# Patient Record
Sex: FEMALE | Race: WHITE | NOT HISPANIC OR LATINO | URBAN - NONMETROPOLITAN AREA
[De-identification: names, ages, dates, MRNs, and addresses within clinical notes are randomized per-mention and may not be internally consistent; named-entity substitution may affect disease eponyms.]

---

## 2018-10-15 PROBLEM — H52.4: Noted: 2018-10-15

## 2018-10-15 PROBLEM — H43.813: Noted: 2018-10-15

## 2018-10-15 PROBLEM — H25.13: Noted: 2018-10-15

## 2021-06-25 ENCOUNTER — IMPORTED ENCOUNTER (OUTPATIENT)
Dept: URBAN - NONMETROPOLITAN AREA CLINIC 1 | Facility: CLINIC | Age: 76
End: 2021-06-25

## 2021-06-25 PROCEDURE — 92014 COMPRE OPH EXAM EST PT 1/>: CPT

## 2021-06-25 PROCEDURE — 92015 DETERMINE REFRACTIVE STATE: CPT

## 2021-06-25 NOTE — PATIENT DISCUSSION
Mansi OUDiscussed diagnosis with patient. Reviewed symptoms related to cataract progression. Discussed various treatment options with patient. Recommend cataract evaluation by Dr. Chiquis Abdalla. Patient elects to schedule evaluation. PVD OUDiscussed findings of exam in detail with the patient. The risk of retinal detachment in patients with PVDs was discussed with the patient and the warning signs of retinal detachment were carefully reviewed with the patient. The patient was warned to return to the office or contact the ophthalmologist on call immediately if they experience signs of retinal detachment. Continue to monitor. Presbyopia OUDiscussed refractive status in detail with patient. MR done today but do not recommend updating due to cataract progression. Continue to monitor.  ****ONLY USE TROPICAMIDE TO DILATE*****

## 2021-08-18 ENCOUNTER — IMPORTED ENCOUNTER (OUTPATIENT)
Dept: URBAN - NONMETROPOLITAN AREA CLINIC 1 | Facility: CLINIC | Age: 76
End: 2021-08-18

## 2021-08-18 PROCEDURE — 92014 COMPRE OPH EXAM EST PT 1/>: CPT

## 2021-08-18 NOTE — PATIENT DISCUSSION
Cataract(s)-Visually significant cataract OU .-Cataract(s) causing symptomatic impairment of visual function not correctable with a tolerable change in glasses or contact lenses lighting or non-operative means resulting in specific activity limitations and/or participation restrictions including but not limited to reading viewing television driving or meeting vocational or recreational needs. -Expectation is clearer vision and functional improvement in symptoms as well as reduced glare disability after cataract removal.-Order IOLMaster and OPD today. -Recommend Stand/trad  based on today's OPD testing and lifestyle questionnaire.-All questions were answered regarding surgery including pre and post-op medications appointments activity restrictions and anesthetic usage.-The risks benefits and alternatives and special risk factors for the patient were discussed in detail including but not limited to: bleeding infection retinal detachment vitreous loss problems with the implant and possible need for additional surgery.-Although rare the possibility of complete vision loss was discussed.-The possible need for glasses post-operatively was discussed.-Order medical clearance exam based on history of irregular heart beat -Patient elects to proceed with cataract surgery OS. Will schedule at patient's convenience and re-evaluate OD  in the future. Discussed lens options. Pt elects Stand/Trad OU discussed the need for bifocals s/p surgery Post op inflammation anticipated discussed dextenza insertion after surgery.

## 2021-09-14 ENCOUNTER — IMPORTED ENCOUNTER (OUTPATIENT)
Dept: URBAN - NONMETROPOLITAN AREA CLINIC 1 | Facility: CLINIC | Age: 76
End: 2021-09-14

## 2021-09-14 PROBLEM — Z01.818: Noted: 2021-09-14

## 2021-09-14 PROBLEM — F41.1: Noted: 2021-09-14

## 2021-09-14 PROBLEM — Z95.0: Noted: 2021-09-14

## 2021-09-14 PROBLEM — I50.9: Noted: 2021-09-14

## 2021-10-14 ENCOUNTER — IMPORTED ENCOUNTER (OUTPATIENT)
Dept: URBAN - NONMETROPOLITAN AREA CLINIC 1 | Facility: CLINIC | Age: 76
End: 2021-10-14

## 2021-10-14 PROBLEM — H25.11: Noted: 2021-10-14

## 2021-10-14 PROBLEM — Z98.42: Noted: 2021-10-14

## 2021-10-14 PROCEDURE — 99024 POSTOP FOLLOW-UP VISIT: CPT

## 2021-10-14 NOTE — PATIENT DISCUSSION
1 Day POV CE OS 10/13/21 Lensx/LRI+Dextenza- Discussed diagnosis in detail with patient- Patient is stable and doing well- Wound intact- Continue all post op drops as directed- Continue to monitor- RTC 1 week POV Cataract OD-Visually significant.-Cataract causing symptomatic impairment of visual function not correctable with a tolerable change in glasses or contact lenses lighting or non-operative means resulting in specific activity limitations and/or participation restrictions including but not limited to reading viewing television driving or meeting vocational or recreational needs. -Expectation is clearer vision and reduced glare disability after cataract removal.-Refer to Dr Fam Slater for cataract evaluation

## 2021-10-19 ENCOUNTER — IMPORTED ENCOUNTER (OUTPATIENT)
Dept: URBAN - NONMETROPOLITAN AREA CLINIC 1 | Facility: CLINIC | Age: 76
End: 2021-10-19

## 2021-10-19 PROBLEM — Z95.0: Noted: 2021-10-19

## 2021-10-19 PROBLEM — F41.9: Noted: 2021-10-19

## 2021-10-19 PROBLEM — F90.2: Noted: 2021-10-19

## 2021-10-19 PROBLEM — Z98.42: Noted: 2021-10-19

## 2021-10-19 PROBLEM — H25.811: Noted: 2021-10-19

## 2021-10-19 PROBLEM — I50.9: Noted: 2021-10-19

## 2021-10-19 PROBLEM — E03.9: Noted: 2021-10-19

## 2021-10-19 PROBLEM — Z01.818: Noted: 2021-10-19

## 2021-10-19 PROCEDURE — 99024 POSTOP FOLLOW-UP VISIT: CPT

## 2021-10-19 NOTE — PATIENT DISCUSSION
Cataract(s)-Visually significant cataract OD . -Cataract(s) causing symptomatic impairment of visual function not correctable with a tolerable change in glasses or contact lenses lighting or non-operative means resulting in specific activity limitations and/or participation restrictions including but not limited to reading viewing television driving or meeting vocational or recreational needs. -Expectation is clearer vision and functional improvement in symptoms as well as reduced glare disability after cataract removal.-Recommend Toric IOL   /   Limbal Relaxing Incisions based on previous OPD testing and lifestyle questionnaire.-All questions were answered regarding surgery including pre and post-op medications appointments activity restrictions and anesthetic usage.-The risks benefits and alternatives and special risk factors for the patient were discussed in detail including but not limited to: bleeding infection retinal detachment vitreous loss problems with the implant and possible need for additional surgery.-Although rare the possibility of complete vision loss was discussed.-The need for glasses post-operatively was discussed.-Patient elects to proceed with cataract surgery OD . Will schedule at patient's convenience. s/p PCIOL CE OS LRI/LenSx + Dex -Pt doing well at 1 week s/p PCIOL. -Continue post-op gtts according to instruction sheet.-Okay to resume usual activites and d/c eye shield.

## 2021-11-11 ENCOUNTER — IMPORTED ENCOUNTER (OUTPATIENT)
Dept: URBAN - NONMETROPOLITAN AREA CLINIC 1 | Facility: CLINIC | Age: 76
End: 2021-11-11

## 2021-11-11 PROBLEM — Z98.41: Noted: 2021-11-11

## 2021-11-11 PROBLEM — Z98.42: Noted: 2021-10-19

## 2021-11-11 PROCEDURE — 99024 POSTOP FOLLOW-UP VISIT: CPT

## 2021-11-11 NOTE — PATIENT DISCUSSION
1 Day POV CE OD 11/10/21 LenSx/Toric CE OS 10/13/21 LenSx/LRI w Dextenza OU - Discussed diagnosis in detail with patient. - Patient doing well and stable. - Continue post op drops as directed. - Continue to monitor. RTC in 1 week for POV

## 2021-11-15 ENCOUNTER — IMPORTED ENCOUNTER (OUTPATIENT)
Dept: URBAN - NONMETROPOLITAN AREA CLINIC 1 | Facility: CLINIC | Age: 76
End: 2021-11-15

## 2021-11-15 PROCEDURE — 99024 POSTOP FOLLOW-UP VISIT: CPT

## 2021-11-15 NOTE — PATIENT DISCUSSION
5 Day POV CE OD 11/10/21 LenSx/Toric CE OS 10/13/21 LenSx/LRI w Dextenza OU - Discussed diagnosis in detail with patient. - Patient doing well and stable. - Continue post op drops as directed. - Recommend +2.00-+2.50 OTC readers.- Continue to monitor. RTC in 3 months for follow up with [facility]

## 2022-02-21 ENCOUNTER — PREPPED CHART (OUTPATIENT)
Dept: URBAN - NONMETROPOLITAN AREA CLINIC 1 | Facility: CLINIC | Age: 77
End: 2022-02-21

## 2022-02-21 ASSESSMENT — VISUAL ACUITY
OD_SC: 20/25-2
OS_SC: 20/30-2

## 2022-04-01 ENCOUNTER — FOLLOW UP (OUTPATIENT)
Dept: URBAN - NONMETROPOLITAN AREA CLINIC 1 | Facility: CLINIC | Age: 77
End: 2022-04-01

## 2022-04-01 DIAGNOSIS — Z96.1: ICD-10-CM

## 2022-04-01 PROCEDURE — 99213 OFFICE O/P EST LOW 20 MIN: CPT

## 2022-04-01 ASSESSMENT — VISUAL ACUITY
OD_SC: 20/29
OS_SC: 20/25

## 2022-04-01 ASSESSMENT — TONOMETRY
OD_IOP_MMHG: 16
OS_IOP_MMHG: 16

## 2022-04-01 NOTE — PATIENT DISCUSSION
Discussed findings with patient. 1+papillae noted OU. Start Pataday QD OU X 2 weeks then PRN, coupon given today. Continue to monitor.

## 2022-04-09 ASSESSMENT — VISUAL ACUITY
OD_SC: 20/50
OD_CC: 20/40-
OD_PAM: 20/20
OS_CC: 20/20-
OS_CC: 20/20-
OD_PAM: 20/20
OS_CC: 20/30
OD_PH: 20/40-2
OD_SC: 20/200
OD_CC: 20/70
OS_AM: 20/20
OD_SC: 20/40-2
OD_CC: 20/20-2
OS_CC: 20/30-2
OS_SC: 20/40-2
OD_PH: 20/50+
OD_PAM: 20/20
OS_CC: 20/100-
OS_SC: 20/40-2

## 2022-04-09 ASSESSMENT — TONOMETRY
OD_IOP_MMHG: 16
OS_IOP_MMHG: 16
OD_IOP_MMHG: 22
OS_IOP_MMHG: 16
OD_IOP_MMHG: 24
OS_IOP_MMHG: 15
OS_IOP_MMHG: 15
OS_IOP_MMHG: 21
OD_IOP_MMHG: 16
OS_IOP_MMHG: 17

## 2022-04-09 ASSESSMENT — KERATOMETRY
OS_K2POWER_DIOPTERS: 45.00
OS_AXISANGLE_DEGREES: 93
OD_AXISANGLE_DEGREES: 92
OD_K2POWER_DIOPTERS: 45.00
OD_K1POWER_DIOPTERS: 44.50
OS_K1POWER_DIOPTERS: 44.75

## 2023-10-16 ENCOUNTER — FOLLOW UP (OUTPATIENT)
Dept: URBAN - NONMETROPOLITAN AREA CLINIC 1 | Facility: CLINIC | Age: 78
End: 2023-10-16

## 2023-10-16 DIAGNOSIS — H16.223: ICD-10-CM

## 2023-10-16 DIAGNOSIS — H43.813: ICD-10-CM

## 2023-10-16 DIAGNOSIS — H52.4: ICD-10-CM

## 2023-10-16 PROCEDURE — 92015 DETERMINE REFRACTIVE STATE: CPT

## 2023-10-16 PROCEDURE — 99214 OFFICE O/P EST MOD 30 MIN: CPT

## 2023-10-16 PROCEDURE — 92250 FUNDUS PHOTOGRAPHY W/I&R: CPT

## 2023-10-16 ASSESSMENT — VISUAL ACUITY
OS_SC: 20/32-2
OU_SC: 20/29-2
OD_SC: 20/32-1

## 2023-10-16 ASSESSMENT — TONOMETRY
OD_IOP_MMHG: 15
OS_IOP_MMHG: 15